# Patient Record
(demographics unavailable — no encounter records)

---

## 2021-07-09 NOTE — CR
Indication:



Pulled on arm, concern for dislocation 



Technique:



Two views right upper extremity



Comparison:



None



Findings:



Bones: Alignment is normal. There is a buckle fracture of the metaphysis of 

the proximal humerus. 



Joint spaces: Unremarkable.  



Soft tissues: Unremarkable.  



Impression:



Buckle fracture of the proximal humerus. Consider non accidental trauma. 

Findings discussed with Dr. Wiseman at 3:32 p.m. on July 9, 2021.



Dictated by Amy Buckley MD @ 7/9/2021 3:35:06 PM



Signed by Dr. Amy Buckley @ Jul 9 2021  3:35PM

## 2021-07-09 NOTE — EDM.PDOC
ED HPI GENERAL MEDICAL PROBLEM





- General


Chief Complaint: Upper Extremity Injury/Pain


Stated Complaint: RT SHOULDER


Time Seen by Provider: 07/09/21 14:12


Source of Information: Reports: Patient


History Limitations: Reports: No Limitations





- History of Present Illness


INITIAL COMMENTS - FREE TEXT/NARRATIVE: 


PEDS HISTORY AND PHYSICAL:





History of present illness:


Patient is a 9-month 21-day-old male who is brought to the emergency room by his

mother with concerns of a right shoulder injury.  Mom states 2 days ago she was 

lifting him up by his arms when she noticed afterwards that he did not want to 

crawl or reach using his right arm.  The arm has been guarded.  Mom states she 

is able to passively perform range of motion although when she lifts his 

shoulder upward he grimaces and/or cries.  Family attempted to get into the 

walk-in clinic, they referred him to the emergency room as they were concerned 

it was "dislocated".  She denies any other extremity involvement or systemic 

complaints.  Childhood immunizations are up-to-date.





Review of systems: 


As per history of present illness and below otherwise all systems reviewed and 

negative.





Past medical history: 


As per history of present illness and as reviewed below otherwise 

noncontributory.





Surgical history: 


As per history of present illness and as reviewed below otherwise 

noncontributory.





Social history: 


No reported history of drug or alcohol abuse.





Family history: 


As per history of present illness and as reviewed below otherwise 

noncontributory.





Physical exam:


General: Well developed and well nourished 9-month 21-day-old male.  Alert and 

appropriate for age.  Nontoxic-appearing and in no acute distress.  Patient is 

breast-feeding during my physical examination.  Mom is attentive to child's 

needs.  Vital signs are stable and have been reviewed by me.


HEENT: Atraumatic, normocephalic, pupils reactive, negative for conjunctival 

pallor or scleral icterus, mucous membranes moist, throat clear, neck supple, 

nontender, trachea midline.  TMs normal bilaterally, no cervical adenopathy or 

nuchal rigidity.  


Lungs: Clear to auscultation, breath sounds equal bilaterally, no obvious 

clavicle tenderness or injury, chest nontender. No work of breathing, no 

accessory muscles use. 


Heart: S1S2, regular rate and rhythm, no overt murmurs


Abdomen: Soft, nondistended, nontender. Negative for masses or he

patosplenomegaly. Normal abdominal bowel sounds.  


Pelvis: Stable is nontender.


Hematologic: No petechiae or purpra. Mucosa appropriate color and normal nail 

bed color and refill.


Skin:  Normal turgor, no overt rash or lesions.  No bruising or lesions noted.


Extremities: Full range of motion without defects or deficits, does wince when 

the right shoulder is lifted greater than 120 degrees.  Good flexion and 

extension of the elbow and wrist.  Strong radial pulse.  Cap refill less than 3 

seconds. Neurovascular unremarkable.


Neuro: Awake, alert, and age appropriate. Cranial nerves II through XII 

unremarkable. Cerebellum unremarkable. Motor and sensory unremarkable 

throughout. Exam nonfocal.





Notes:


This patient was seen and evaluated during the 2020 SARS-CoV-2 novel coronavirus

pandemic period.  Community viral transmission is ongoing at time of this 

encounter and the emergency department is operating under pandemic response 

procedures





Patient is a 9-month 21-day-old male who is brought into the emergency with 

concerns of a right shoulder injury and/or dislocation.  Patient is breast-

feeding and appears attentive during my physical examination.  He does have 

grimacing when I do move the shoulder greater then 120 degrees.  We will obtain 

an x-ray of the shoulder, humerus, elbow and forearm.  





X-ray shows a buckle fracture of the proximal humerus. Radiologist called to 

speak with be about imagining and voices concern to consider non accidental 

trauma. Report will be filed for patient safety although the child is well 

appearing otherwise. 1/2 cast fiberglass splint applied for immobilization.  

Referred patient to orthopedics, the orthopedic nurse will call to schedule an 

appointment with the patient.  I have spoken with the patient/caregiver and 

discussed today's findings, in addition to providing specific details for plan 

of care.  Reassessment at the time of disposition demonstrates that the patient 

is in no acute distress. The patient is stable for discharge, counseling was 

provided and we discussed in great detail signs and symptoms that would prompt 

them to return to the Emergency Department. Medication, follow up and supportive

care measures were reviewed and discussed. Voices understanding and is agreeable

to plan of care. Denies any further questions or concerns at this time.





Diagnostics:


Upper extremity x-ray





Therapeutics:


1/2 cast fiberglass splint





Prescription:


None





Impression: 


Humerus fracture, right





Plan:


1.  You were evaluated today on an emergent basis. [facility] x-ray shows a buckle

fracture of the proximal humerus. Please use the splint while he is awake, pin 

it to his shirt for comfort. Follow up with orthopedic provider or primary care 

provider. 


2.  You can alternate Tylenol and/or ibuprofen as needed for pain or fever 

management.  


3. If your symptoms should worsen, new symptoms develop or any of the signs and 

symptoms we discussed should arise please return to the emergency room or call 

911 (if needed).





Definitive disposition and diagnosis as appropriate pending reevaluation and 

review of above.





- Related Data


                                    Allergies











Allergy/AdvReac Type Severity Reaction Status Date / Time


 


No Known Allergies Allergy   Verified 07/09/21 14:28











Home Meds: 


                                    Home Meds





. [No Known Home Meds]  07/09/21 [History]











Past Medical History





- Past Health History


Medical/Surgical History: Denies Medical/Surgical History





- Infectious Disease History


Infectious Disease History: Reports: None





Social & Family History





- Tobacco Use


Tobacco Use Status *Q: Never Tobacco User





Review of Systems





- Review of Systems


Review Of Systems: Comprehensive ROS is negative, except as noted in HPI.





ED EXAM, GENERAL





- Physical Exam


Exam: See Below (See dictation)





Course





- Vital Signs


Last Recorded V/S: 


                                Last Vital Signs











Temp  96.8 F   07/09/21 14:28


 


Pulse  140   07/09/21 14:28


 


Resp  30   07/09/21 14:28


 


BP      


 


Pulse Ox  100   07/09/21 14:28














- Orders/Labs/Meds


Orders: 


                               Active Orders 24 hr











 Category Date Time Status


 


 DME for Discharge [COMM] Stat Oth  07/09/21 15:44 Ordered














Departure





- Departure


Time of Disposition: 15:59


Disposition: Home, Self-Care 01


Clinical Impression: 


Proximal humeral fracture


Qualifiers:


 Encounter type: initial encounter Fracture type: closed Fracture morphology: 

other fracture Fracture alignment: nondisplaced Laterality: right Qualified 

Code(s): S42.294A - Other nondisplaced fracture of upper end of right humerus, 

initial encounter for closed fracture








- Discharge Information


Instructions:  Humerus Fracture Treated With Immobilization


Referrals: 


Kimmy Reese NP [Primary Care Provider] - 


Forms:  ED Department Discharge


Additional Instructions: 


The following information is given to patients seen in the emergency department 

who are being discharged to home. This information is to outline your options 

for follow-up care. We provide all patients seen in our emergency department 

with a follow-up referral.





The need for follow-up, as well as the timing and circumstances, are variable 

depending upon the specifics of your emergency department visit.





If you don't have a primary care physician on staff, we will provide you with a 

referral. We always advise you to contact your personal physician following an 

emergency department visit to inform them of the circumstance of the visit and 

for follow-up with them and/or the need for any referrals to a consulting 

specialist.





The emergency department will also refer you to a specialist when appropriate. 

This referral assures that you have the opportunity for follow-up care with a 

specialist. All of these measure are taken in an effort to provide you with 

optimal care, which includes your follow-up.





Under all circumstances we always encourage you to contact your private 

physician who remains a resource for coordinating your care. When calling for 

follow-up care, please make the office aware that this follow-up is from your 

recent emergency room visit. If for any reason you are refused follow-up, please

contact the [facility] Emergency Department

at [phone] and asked to speak to the emergency department charge nurse.





[facility]


Primary Care


[address] [place] [address]


Phone: [phone]


Fax: [phone]





[facility]


Specialty Care - Orthopedic Clinic


[address], [address]


[place] [address]


Phone: [phone]





1.  You were evaluated today on an emergent basis. [facility] x-ray shows a buckle

fracture of the proximal humerus. Please use the splint while he is awake, pin 

it to his shirt for comfort. Follow up with orthopedic provider or primary care 

provider. 


2.  You can alternate Tylenol and/or ibuprofen as needed for pain or fever 

management.  


3. If your symptoms should worsen, new symptoms develop or any of the signs and 

symptoms we discussed should arise please return to the emergency room or call 

911 (if needed).





Sepsis Event Note (ED)





- Focused Exam


Vital Signs: 


                                   Vital Signs











  Temp Pulse Resp Pulse Ox


 


 07/09/21 14:28  96.8 F  140  30  100














- My Orders


Last 24 Hours: 


My Active Orders





07/09/21 15:44


DME for Discharge [COMM] Stat 














- Assessment/Plan


Last 24 Hours: 


My Active Orders





07/09/21 15:44


DME for Discharge [COMM] Stat